# Patient Record
Sex: FEMALE | Race: OTHER
[De-identification: names, ages, dates, MRNs, and addresses within clinical notes are randomized per-mention and may not be internally consistent; named-entity substitution may affect disease eponyms.]

---

## 2020-06-08 ENCOUNTER — HOSPITAL ENCOUNTER (EMERGENCY)
Dept: HOSPITAL 61 - ER | Age: 30
Discharge: HOME | End: 2020-06-08
Payer: SELF-PAY

## 2020-06-08 VITALS — WEIGHT: 200.4 LBS | HEIGHT: 61 IN | BODY MASS INDEX: 37.84 KG/M2

## 2020-06-08 VITALS — DIASTOLIC BLOOD PRESSURE: 68 MMHG | SYSTOLIC BLOOD PRESSURE: 121 MMHG

## 2020-06-08 DIAGNOSIS — Z3A.01: ICD-10-CM

## 2020-06-08 DIAGNOSIS — O46.91: Primary | ICD-10-CM

## 2020-06-08 DIAGNOSIS — R10.2: ICD-10-CM

## 2020-06-08 LAB
ALBUMIN SERPL-MCNC: 3.4 G/DL (ref 3.4–5)
ALBUMIN/GLOB SERPL: 0.9 {RATIO} (ref 1–1.7)
ALP SERPL-CCNC: 93 U/L (ref 46–116)
ALT SERPL-CCNC: 15 U/L (ref 14–59)
ANION GAP SERPL CALC-SCNC: 9 MMOL/L (ref 6–14)
APTT PPP: (no result) S
AST SERPL-CCNC: 9 U/L (ref 15–37)
BACTERIA #/AREA URNS HPF: (no result) /HPF
BASOPHILS # BLD AUTO: 0 X10^3/UL (ref 0–0.2)
BASOPHILS NFR BLD: 0 % (ref 0–3)
BILIRUB SERPL-MCNC: 0.2 MG/DL (ref 0.2–1)
BILIRUB UR QL STRIP: NEGATIVE
BUN SERPL-MCNC: 10 MG/DL (ref 7–20)
BUN/CREAT SERPL: 14 (ref 6–20)
CALCIUM SERPL-MCNC: 8.7 MG/DL (ref 8.5–10.1)
CHLORIDE SERPL-SCNC: 101 MMOL/L (ref 98–107)
CO2 SERPL-SCNC: 28 MMOL/L (ref 21–32)
CREAT SERPL-MCNC: 0.7 MG/DL (ref 0.6–1)
EOSINOPHIL NFR BLD: 0.2 X10^3/UL (ref 0–0.7)
EOSINOPHIL NFR BLD: 2 % (ref 0–3)
ERYTHROCYTE [DISTWIDTH] IN BLOOD BY AUTOMATED COUNT: 14.2 % (ref 11.5–14.5)
FIBRINOGEN PPP-MCNC: CLEAR MG/DL
GFR SERPLBLD BASED ON 1.73 SQ M-ARVRAT: 100.4 ML/MIN
GLOBULIN SER-MCNC: 3.6 G/DL (ref 2.2–3.8)
GLUCOSE SERPL-MCNC: 170 MG/DL (ref 70–99)
HCT VFR BLD CALC: 38.9 % (ref 36–47)
HGB BLD-MCNC: 13.1 G/DL (ref 12–15.5)
LYMPHOCYTES # BLD: 4.1 X10^3/UL (ref 1–4.8)
LYMPHOCYTES NFR BLD AUTO: 34 % (ref 24–48)
MCH RBC QN AUTO: 27 PG (ref 25–35)
MCHC RBC AUTO-ENTMCNC: 34 G/DL (ref 31–37)
MCV RBC AUTO: 80 FL (ref 79–100)
MONO #: 0.8 X10^3/UL (ref 0–1.1)
MONOCYTES NFR BLD: 6 % (ref 0–9)
NEUT #: 6.9 X10^3/UL (ref 1.8–7.7)
NEUTROPHILS NFR BLD AUTO: 57 % (ref 31–73)
NITRITE UR QL STRIP: NEGATIVE
PH UR STRIP: 6 [PH]
PLATELET # BLD AUTO: 288 X10^3/UL (ref 140–400)
POTASSIUM SERPL-SCNC: 3.8 MMOL/L (ref 3.5–5.1)
PROT SERPL-MCNC: 7 G/DL (ref 6.4–8.2)
PROT UR STRIP-MCNC: 30 MG/DL
RBC # BLD AUTO: 4.87 X10^6/UL (ref 3.5–5.4)
RBC #/AREA URNS HPF: (no result) /HPF (ref 0–2)
SODIUM SERPL-SCNC: 138 MMOL/L (ref 136–145)
SQUAMOUS #/AREA URNS LPF: (no result) /LPF
UROBILINOGEN UR-MCNC: 0.2 MG/DL
WBC # BLD AUTO: 12 X10^3/UL (ref 4–11)
WBC #/AREA URNS HPF: 0 /HPF (ref 0–4)

## 2020-06-08 PROCEDURE — 81001 URINALYSIS AUTO W/SCOPE: CPT

## 2020-06-08 PROCEDURE — 99285 EMERGENCY DEPT VISIT HI MDM: CPT

## 2020-06-08 PROCEDURE — 84702 CHORIONIC GONADOTROPIN TEST: CPT

## 2020-06-08 PROCEDURE — 76817 TRANSVAGINAL US OBSTETRIC: CPT

## 2020-06-08 PROCEDURE — 86900 BLOOD TYPING SEROLOGIC ABO: CPT

## 2020-06-08 PROCEDURE — 87086 URINE CULTURE/COLONY COUNT: CPT

## 2020-06-08 PROCEDURE — 36415 COLL VENOUS BLD VENIPUNCTURE: CPT

## 2020-06-08 PROCEDURE — 80053 COMPREHEN METABOLIC PANEL: CPT

## 2020-06-08 PROCEDURE — 88305 TISSUE EXAM BY PATHOLOGIST: CPT

## 2020-06-08 PROCEDURE — 85025 COMPLETE CBC W/AUTO DIFF WBC: CPT

## 2020-06-08 PROCEDURE — 86850 RBC ANTIBODY SCREEN: CPT

## 2020-06-08 PROCEDURE — 81025 URINE PREGNANCY TEST: CPT

## 2020-06-08 PROCEDURE — 86901 BLOOD TYPING SEROLOGIC RH(D): CPT

## 2020-06-08 PROCEDURE — A7015 AEROSOL MASK USED W NEBULIZE: HCPCS

## 2020-06-08 NOTE — PHYS DOC
Past Medical History


Past Medical History:  No Pertinent History


Past Surgical History:  No Surgical History


Smoking Status:  Never Smoker


Alcohol Use:  None





General Adult


EDM:


Chief Complaint:  VAGINAL BLEEDING PREGNANCY





HPI:


HPI:





Patient is a 29  year old female presenting to the ED with a chief complaint of 

vaginal bleeding.  Patient is G3, P1 and is 7 weeks pregnant.  Patient states 

that she had spotting for the last 2 days and mild pelvic cramping.  Patient 

denies fever, chills, nausea, vomiting, dysuria.





Review of Systems:


Review of Systems:


Constitutional:  Denies fever or chills. []


Eyes:  Denies change in visual acuity. []


HENT:  Denies nasal congestion or sore throat. [] 


Respiratory:  Denies cough or shortness of breath. [] 


Cardiovascular:  Denies chest pain or edema. [] 


GI:  Denies abdominal pain, nausea, vomiting, bloody stools or diarrhea. [] 


: Complains of vaginal bleeding and pelvic pain


Neurologic:  Denies headache, focal weakness or sensory changes. []





Heart Score:


Risk Factors:


Risk Factors:  DM, Current or recent (<one month) smoker, HTN, HLP, family 

history of CAD, obesity.


Risk Scores:


Score 0 - 3:  2.5% MACE over next 6 weeks - Discharge Home


Score 4 - 6:  20.3% MACE over next 6 weeks - Admit for Clinical Observation


Score 7 - 10:  72.7% MACE over next 6 weeks - Early Invasive Strategies





Allergies:


Allergies:





Allergies








Coded Allergies Type Severity Reaction Last Updated Verified


 


  No Known Drug Allergies    5/14/15 No











Physical Exam:


PE:





Constitutional: Well developed, well nourished, no acute distress, non-toxic 

appearance. []


HENT: Normocephalic, atraumatic


Eyes: EOMI


Neck: Normal range of motion, Supple


Cardiovascular: Heart rate regular rhythm


Lungs & Thorax:  Bilateral breath sounds clear to auscultation []


Abdomen: Bowel sounds normal, soft, no tenderness, gravid abdomen


Extremities: No tenderness, ROM intact


Neurologic: Alert and oriented X 3





Current Patient Data:


Vital Signs:





                                   Vital Signs








  Date Time  Temp Pulse Resp B/P (MAP) Pulse Ox O2 Delivery O2 Flow Rate FiO2


 


6/8/20 04:23 98.2 113 18 145/75 (98) 100 Room Air  





 98.2       











EKG:


EKG:


[]





Radiology/Procedures:


Radiology/Procedures:


[]





Course & Med Decision Making:


Course & Med Decision Making


Pertinent Labs and Imaging studies reviewed. (See chart for details)





Ordered labs, UA, beta-hCG, ultrasound of the pelvis





Beta-hCG level is 2304.





The ultrasound tech states that the ultrasound does not show fetal sac.


Most likely" indicative of threatened miscarriage.





Patient to follow-up with her OB/GYN.





Cameronon Disclaimer:


Dragon Disclaimer:


This electronic medical record was generated, in whole or in part, using a voice

 recognition dictation system.





Departure


Departure


Referrals:  


NO PCP (PCP)





Justicifation of Admission Dx:


Justifications for Admission:


Justification of Admission Dx:  No











CASS MCALLISTER DO            Jun 8, 2020 04:57

## 2020-06-08 NOTE — OP
DATE OF SURGERY:  2020



PREOPERATIVE DIAGNOSIS:  Ectopic pregnancy.



POSTOPERATIVE DIAGNOSIS:  Ectopic pregnancy.



PROCEDURE:  Diagnostic laparoscopy.



SURGEON:  Mendel Beth MD



ANESTHESIA:  GETA.



ESTIMATED BLOOD LOSS:  10 mL.



COMPLICATIONS:  None.



FINDINGS:  Small tissue at the cervical os.  Normal size uterus.  Normal

fallopian tubes and ovaries bilaterally.



SUMMARY:  A 29-year-old female  3, para 1 at about 7 weeks presented with

left lower quadrant abdominal pain and vaginal bleeding.  HCG level was greater

than 2300 with sonogram indicating no intrauterine pregnancy with possible 2 cm

cyst or lesion on the left side.  The patient was counseled on the risks,

benefits and expectations of laparoscopic removal of ectopic pregnancy with

possibility of left salpingectomy.  She voiced clear understanding to proceed.



DESCRIPTION OF PROCEDURE:  The patient was taken to surgery suite and placed in

dorsal lithotomy position, was prepped with Betadine solution and draped with

Betadine solution for vaginal prep and ChloraPrep for abdominal prep and draped

in sterile fashion.  After adequate anesthesia, bivalve speculum was placed

vaginally.  Anterior lip of the cervix was grasped with single tooth tenaculum. 

There was a small amount of tissue at the cervical os that was removed with ring

forceps.  Uterine acorn manipulator was then placed.  Attention was then placed

on abdomen.



A small transverse skin incision was made with scalpel just below the umbilicus.

 The Veress needle was then placed through the infraumbilical incision site. 

The abdomen was allowed to insufflate up to 1.5 L CO2 gas.  The Veress needle

was then removed.  A 5 mm trocar was placed.  Scope was positioned.  The uterus

appeared normal size.  Fallopian tubes and ovaries appeared normal bilaterally. 

There was not any evidence of ectopic pregnancy around the ovary or fallopian

tubes.  The remainder of the abdominal cavity was evaluated without any evidence

of pregnancy.  The trocars were then removed under direct visualization.  The

abdomen was allowed to deflate as much as possible along with mechanical

manipulation.  The two skin incisions were reapproximated using 4-0 Vicryl

suture in a subcuticular manner.  A 0.25% Marcaine with epinephrine was injected

at each incision site.  Uterine acorn manipulator and single tooth tenaculum

were removed.  The patient tolerated the procedure well and recovered in the

biplane room due to her COVID unknown status without any difficulty and would be

discharged home the same day and follow up in clinic in the next 2 days.

 



______________________________

MENDEL BETH MD



DR:  KIRSTIE/clary  JOB#:  [id number] / [id number]

DD:  2020 10:51  DT:  2020 12:28

## 2020-06-08 NOTE — PDOC1
History and Physical


Date of Admission


Date of Admission


DATE: 20 


TIME: 07:58





Identification/Chief Complaint


Chief Complaint


vaginal bleeding





Source


Source:  Chart review, Patient





History of Present Illness


History of Present Illness


 30 y/o  @ 7 wks by LMP presented to ED with c/o vaginal bleeding.  HCG 

level is 2300 and sono did not indicate gestational sac in uterus.  Pt. with 

tachycardia.





Past Surgical History


Past Surgical History:  No pertinent history





Current Medications


Current Medications





Current Medications


Sodium Chloride 1,000 ml @  1,000 mls/hr 1X  ONCE IV  Last administered on 

20at 04:58;  Start 20 at 05:00;  Stop 20 at 05:59;  Status DC





Active Scripts


Active


Tylenol With Codeine #3 Tablet (Acetaminophen With Codeine) 1 Each Tablet 1 Each

PO Q4H PRN


Ibuprofen 800 Mg Tablet 800 Mg PO Q6H PRN





Allergies


Allergies:  


Coded Allergies:  


     No Known Drug Allergies (Unverified , 5/14/15)





ROS


General:  YES: Fatigue


PSYCHOLOGICAL ROS:  YES: Anxiety; 


   No: Behavioral Disorder, Concentration difficultie, Decreased libido, 

Depression, Disorientation, Hallucinations, Hostility, Irritablity, Memory 

difficulties, Mood Swings, Obsessive thoughts, Physical abuse, Sexual abuse, 

Sleep disturbances, Suicidal ideation, Other


Eyes:  No Blurry vision, No Decreased vision, No Double vision, No Dry eyes, No 

Excessive tearing, No Eye Pain, No Itchy Eyes, No Loss of vision, No 

Photophobia, No Scotomata, No Uses contacts, No Uses glasses, No Other


HEENT:  No: Heacaches, Visual Changes, Hearing change, Nasal congestion, Nasal 

discharge, Oral lesions, Sinus pain, Sore Throat, Epistaxis, Sneezing, Snoring, 

Tinnitus, Vertigo, Vocal changes, Other


Hematological and Lymphatic:  No: Bleeding Problems, Blood Clots, Blood 

Transfusions, Brusing, Night Sweats, Pallor, Swollen Lymph Nodes, Other


ENDOCRINE:  No: Breast Changes, Galactorrhea, Hair Pattern Changes, Hot Flashes,

Malaise/lethargy, Mood Swings, Palpitations, Polydipsia/polyuria, Skin Changes, 

Temperature Intolerance, Unexpected Weight Changes, Other


Breast:  No New/Changing Breast Lumps, No Nipple changes, No Nipple discharge, 

No Other


Respiratory:  No: Cough, Hemoptysis, Orthopnea, Pleuritic Pain, Shortness of 

breath, SOB with excertion, Sputum Changes, Stridor, Tachypnea, Wheezing, Other


Cardiovascular:  No Chest Pain, No Palpitations, No Orthopnea, No Paroxysmal 

Noc. Dyspnea, No Edema, No Lt Headedness, No Other


Gastrointestinal:  No Nausea, No Vomiting, No Abdominal Pain, No Diarrhea, No 

Constipation, No Melena, No Hematochezia, No Other


Skin:  No Dry Skin, No Eczema, No Hair Changes, No Lumps, No Mole Changes, No 

Mottling, No Nail Changes, No Pruritus, No Rash, No Skin Lesion Changes, No 

Other, No Acne





Physical Exam


General:  mild distress


HEENT:  Atraumatic


Heart:  other (tachycardia)


Abdomen:  Soft, No tenderness


PELVIC:  Other (deferred for possible ectopic pregnancy)


Psych/Mental Status:  Mental status NL





Vitals


Vitals





Vital Signs








  Date Time  Temp Pulse Resp B/P (MAP) Pulse Ox O2 Delivery O2 Flow Rate FiO2


 


20 06:25  109 19 115/65 (82) 100 Room Air  


 


20 04:23 98.2       





 98.2       











Labs


Labs





Laboratory Tests








Test


 20


04:55 20


06:45 20


06:50


 


White Blood Count


 12.0 x10^3/uL


(4.0-11.0) 


 





 


Red Blood Count


 4.87 x10^6/uL


(3.50-5.40) 


 





 


Hemoglobin


 13.1 g/dL


(12.0-15.5) 


 





 


Hematocrit


 38.9 %


(36.0-47.0) 


 





 


Mean Corpuscular Volume 80 fL ()   


 


Mean Corpuscular Hemoglobin 27 pg (25-35)   


 


Mean Corpuscular Hemoglobin


Concent 34 g/dL


(31-37) 


 





 


Red Cell Distribution Width


 14.2 %


(11.5-14.5) 


 





 


Platelet Count


 288 x10^3/uL


(140-400) 


 





 


Neutrophils (%) (Auto) 57 % (31-73)   


 


Lymphocytes (%) (Auto) 34 % (24-48)   


 


Monocytes (%) (Auto) 6 % (0-9)   


 


Eosinophils (%) (Auto) 2 % (0-3)   


 


Basophils (%) (Auto) 0 % (0-3)   


 


Neutrophils # (Auto)


 6.9 x10^3/uL


(1.8-7.7) 


 





 


Lymphocytes # (Auto)


 4.1 x10^3/uL


(1.0-4.8) 


 





 


Monocytes # (Auto)


 0.8 x10^3/uL


(0.0-1.1) 


 





 


Eosinophils # (Auto)


 0.2 x10^3/uL


(0.0-0.7) 


 





 


Basophils # (Auto)


 0.0 x10^3/uL


(0.0-0.2) 


 





 


Maternal Serum HCG Beta


Subunit 2304 mIU/mL


(0-5) 


 





 


Sodium Level


 138 mmol/L


(136-145) 


 





 


Potassium Level


 3.8 mmol/L


(3.5-5.1) 


 





 


Chloride Level


 101 mmol/L


() 


 





 


Carbon Dioxide Level


 28 mmol/L


(21-32) 


 





 


Anion Gap 9 (6-14)   


 


Blood Urea Nitrogen


 10 mg/dL


(7-20) 


 





 


Creatinine


 0.7 mg/dL


(0.6-1.0) 


 





 


Estimated GFR


(Cockcroft-Gault) 100.4 


 


 





 


BUN/Creatinine Ratio 14 (6-20)   


 


Glucose Level


 170 mg/dL


(70-99) 


 





 


Calcium Level


 8.7 mg/dL


(8.5-10.1) 


 





 


Total Bilirubin


 0.2 mg/dL


(0.2-1.0) 


 





 


Aspartate Amino Transf


(AST/SGOT) 9 U/L (15-37) 


 


 





 


Alanine Aminotransferase


(ALT/SGPT) 15 U/L (14-59) 


 


 





 


Alkaline Phosphatase


 93 U/L


() 


 





 


Total Protein


 7.0 g/dL


(6.4-8.2) 


 





 


Albumin


 3.4 g/dL


(3.4-5.0) 


 





 


Albumin/Globulin Ratio 0.9 (1.0-1.7)   


 


Urine Collection Type  Void  


 


Urine Color  Pink  


 


Urine Clarity  Clear  


 


Urine pH  6.0 (<5.0-8.0)  


 


Urine Specific Gravity


 


 1.010


(1.000-1.030) 





 


Urine Protein


 


 30 mg/dL


(NEG-TRACE) 





 


Urine Glucose (UA)


 


 Negative mg/dL


(NEG) 





 


Urine Ketones (Stick)


 


 Negative mg/dL


(NEG) 





 


Urine Blood  Large (NEG)  


 


Urine Nitrite  Negative (NEG)  


 


Urine Bilirubin  Negative (NEG)  


 


Urine Urobilinogen Dipstick


 


 0.2 mg/dL (0.2


mg/dL) 





 


Urine Leukocyte Esterase  Trace (NEG)  


 


Urine RBC


 


 Tntc /HPF


(0-2) 





 


Urine WBC  0 /HPF (0-4)  


 


Urine Squamous Epithelial


Cells 


 Few /LPF 


 





 


Urine Bacteria


 


 Few /HPF


(0-FEW) 





 


Bedside Urine HCG, Qualitative


 


 


 Hcg positive


(Negative)








Laboratory Tests








Test


 20


04:55 20


06:45 20


06:50


 


White Blood Count


 12.0 x10^3/uL


(4.0-11.0) 


 





 


Red Blood Count


 4.87 x10^6/uL


(3.50-5.40) 


 





 


Hemoglobin


 13.1 g/dL


(12.0-15.5) 


 





 


Hematocrit


 38.9 %


(36.0-47.0) 


 





 


Mean Corpuscular Volume 80 fL ()   


 


Mean Corpuscular Hemoglobin 27 pg (25-35)   


 


Mean Corpuscular Hemoglobin


Concent 34 g/dL


(31-37) 


 





 


Red Cell Distribution Width


 14.2 %


(11.5-14.5) 


 





 


Platelet Count


 288 x10^3/uL


(140-400) 


 





 


Neutrophils (%) (Auto) 57 % (31-73)   


 


Lymphocytes (%) (Auto) 34 % (24-48)   


 


Monocytes (%) (Auto) 6 % (0-9)   


 


Eosinophils (%) (Auto) 2 % (0-3)   


 


Basophils (%) (Auto) 0 % (0-3)   


 


Neutrophils # (Auto)


 6.9 x10^3/uL


(1.8-7.7) 


 





 


Lymphocytes # (Auto)


 4.1 x10^3/uL


(1.0-4.8) 


 





 


Monocytes # (Auto)


 0.8 x10^3/uL


(0.0-1.1) 


 





 


Eosinophils # (Auto)


 0.2 x10^3/uL


(0.0-0.7) 


 





 


Basophils # (Auto)


 0.0 x10^3/uL


(0.0-0.2) 


 





 


Maternal Serum HCG Beta


Subunit 2304 mIU/mL


(0-5) 


 





 


Sodium Level


 138 mmol/L


(136-145) 


 





 


Potassium Level


 3.8 mmol/L


(3.5-5.1) 


 





 


Chloride Level


 101 mmol/L


() 


 





 


Carbon Dioxide Level


 28 mmol/L


(21-32) 


 





 


Anion Gap 9 (6-14)   


 


Blood Urea Nitrogen


 10 mg/dL


(7-20) 


 





 


Creatinine


 0.7 mg/dL


(0.6-1.0) 


 





 


Estimated GFR


(Cockcroft-Gault) 100.4 


 


 





 


BUN/Creatinine Ratio 14 (6-20)   


 


Glucose Level


 170 mg/dL


(70-99) 


 





 


Calcium Level


 8.7 mg/dL


(8.5-10.1) 


 





 


Total Bilirubin


 0.2 mg/dL


(0.2-1.0) 


 





 


Aspartate Amino Transf


(AST/SGOT) 9 U/L (15-37) 


 


 





 


Alanine Aminotransferase


(ALT/SGPT) 15 U/L (14-59) 


 


 





 


Alkaline Phosphatase


 93 U/L


() 


 





 


Total Protein


 7.0 g/dL


(6.4-8.2) 


 





 


Albumin


 3.4 g/dL


(3.4-5.0) 


 





 


Albumin/Globulin Ratio 0.9 (1.0-1.7)   


 


Urine Collection Type  Void  


 


Urine Color  Pink  


 


Urine Clarity  Clear  


 


Urine pH  6.0 (<5.0-8.0)  


 


Urine Specific Gravity


 


 1.010


(1.000-1.030) 





 


Urine Protein


 


 30 mg/dL


(NEG-TRACE) 





 


Urine Glucose (UA)


 


 Negative mg/dL


(NEG) 





 


Urine Ketones (Stick)


 


 Negative mg/dL


(NEG) 





 


Urine Blood  Large (NEG)  


 


Urine Nitrite  Negative (NEG)  


 


Urine Bilirubin  Negative (NEG)  


 


Urine Urobilinogen Dipstick


 


 0.2 mg/dL (0.2


mg/dL) 





 


Urine Leukocyte Esterase  Trace (NEG)  


 


Urine RBC


 


 Tntc /HPF


(0-2) 





 


Urine WBC  0 /HPF (0-4)  


 


Urine Squamous Epithelial


Cells 


 Few /LPF 


 





 


Urine Bacteria


 


 Few /HPF


(0-FEW) 





 


Bedside Urine HCG, Qualitative


 


 


 Hcg positive


(Negative)











VTE Prophylaxis Ordered


VTE Prophylaxis Devices:  No


VTE Pharmacological Prophylaxi:  No





Assessment/Plan


Assessment/Plan


A: 7 wk gestation with ectopic pregnancy


P: Plan LPSC removal ectopic pregnancy possible Left salpingectomy.





Justicifation of Admission Dx:


Justifications for Admission:


Justification of Admission Dx:  Yes


Comments:


ectopic pregnancy











DORIAN JOE Jr, MD           2020 08:06

## 2020-06-08 NOTE — DISCH
DISCHARGE INSTRUCTIONS


Condition on Discharge


Condition on Discharge:  Stable





Activity After Discharge


Activity Instructions for Disc:  Activity as tolerated


Lifting Instructions after Dis:  No heavy lifting, No pulling or pushing, Do not

lift >10 pounds


Exercise Instruction after Dis:  Progress as tolerated


Driving Instructions after Dis:  Do not drive today


Weight Bearing Status after Di:  As tolerated





Diet after Discharge


Diet after Discharge:  Regular


Diet Texture:  Regular





Wound Incision Care


Wound/Incision Care:  No wound care needed





Checks after Discharge


Checks after discharge:  Check your Temp as needed





Contacting the [facility] after DC


Call your doctor for:  If your condition worsens





Follow-Up


Follow up with:  Dr. Beth in 2 days





Treatment/Equipment after DC


Adaptive Equipment Issued:  None











DORIAN BETH Jr, MD           Jun 8, 2020 10:53

## 2020-06-08 NOTE — PDOC
BRIEF OPERATIVE NOTE


Date:  Jun 8, 2020


Pre-Op Diagnosis


ectopic pregnancy


Post-Op Diagnosis


Same


Procedure Performed


Dx [facility]


Surgeon


Dr. Beth


Anesthesia Type:  General


Blood Loss


10 ml


Specimens Obtained


uterine tissue


Findings


small tissue at cervical os, nml size uterus, nml fallopian tubes and ovaries bi

lNestor


Complications


none











DORIAN BETH Jr, MD           Jun 8, 2020 10:51

## 2020-06-08 NOTE — RAD
INDICATION: Reason: vaginal bleeding / Spl. Instructions:  / History: 

 

COMPARISON: None.

 

TECHNIQUE: Grayscale and color ultrasound images uterus and adnexa.  

Transvaginal imaging was performed.

 

FINDINGS: 

 

Uterus: 110 x 58 x 53 mm.

Endometrial Stripe:  12 mm.

 

Right Ovary: 32 x 19 x 19 mm.  

Left Ovary: 33 x 25 x 23 mm. 

Vascular flow identified to bilateral ovaries.

 

 

IMPRESSION: 

 

*   No intrauterine gestational sac is seen at this time.

 

*  Vascular flow is seen to the bilateral ovaries.

 

*  At the left ovary there is a 17 x 13 mm mildly hypoechoic lesion. Could

be from causes such as hemorrhagic cyst, corpus luteum cyst with 

endometrioma not excluded. Obtaining a follow-up to ensure that there is 

appropriate development of an intrauterine gestational sac to exclude 

early pregnancy failure or ectopic.

 

Electronically signed by: Tim López MD (6/8/2020 6:01 AM) 

[id number]

## 2020-06-09 NOTE — PATHOLOGY
[facility] Accession Number: [id number]

.                                                                01

Material submitted:                                        .

uterus - UTERINE BIOPSY

.                                                                01

Clinical history:                                          .

Ectopic pregnancy

.                                                                02

**********************************************************************

Diagnosis:

Uterine biopsy:

- Decidual cast showing focal hemorrhage and acute inflammation.

([facility]:pit 06/09/2020)

[facility]  06/09/2020  0945 Local

**********************************************************************

.                                                                02

Comment:

No chorionic villi are identified.

([facility] 06/09/2020)

.                                                                02

Electronically signed:                                     .

Erick Whitaker MD, Pathologist

NPI- [id number]

.                                                                01

Gross description:                                         .

The specimen is received in formalin, labeled "Clement, Maria Cis, uterine

biopsy" and consists of 2 segments of hemorrhagic pink-purple possible

decidual tissue measuring 6.3 x 3.8 x 0.6 cm.  Representative tissue is

submitted in A1-A3.

([facility]; 6/8/2020)

[facility]  06/08/2020  1706 Local

.                                                                02

Pathologist provided ICD-10:

O00.90

.                                                                02

CPT                                                        .

[id number]

Specimen Comment: A courtesy copy of this report has been sent to [phone]

Specimen Comment: Report sent to 

***Performed at:  01

   [facility]

   [address], [place]  [id number]

   MD Tommy Bryant MD Phone:  [phone]

***Performed at:  02

   [facility]

   [address], [place]  [id number]

   MD Erick Whitaker MD Phone:  [phone]